# Patient Record
(demographics unavailable — no encounter records)

---

## 2024-10-28 NOTE — DISCUSSION/SUMMARY
[Normal Growth] : growth [Normal Development] : developmental [No Elimination Concerns] : elimination [Continue Regimen] : feeding [No Skin Concerns] : skin [Normal Sleep Pattern] : sleep [None] : no known medical problems [Anticipatory Guidance Given] : Anticipatory guidance addressed as per the history of present illness section [No Vaccines] : no vaccines needed [No Medications] : ~He/She~ is not on any medications [Parent/Guardian] : Parent/Guardian [FreeTextEntry1] : Feedings on demand, with a back up plan for scheduled feedings every 2-3 hours. Once weight gain is well established, it is generally then time to forgo the back up plan scheduled feedings. Clustered feedings, amount per feedings, and spacing of feedings will change frequently; follow the infant's lead.Anticipate 8-12 feedings per day.  Breast feeding benefits reviewed, as well as basic best practices.  Prenatal risk factors for hip dysplasia reviewed. Hospital records reviewed if available. Advance as directed  Vitamin D relevance and importance reviewed Follow up with any directions from the hospital or medical team including any prenatal discussions on matters that may require follow up. Some examples may include sonogram findings related to the kidneys, brain, or heart. [email or web address] as a resource over generic searches Cord and skin care Temperature definitions in infants, measuring temperature, presence or absence of concerning symptoms for temperature context, and appropriate timing of access to care reviewed.    Jaundice  Increase volume of feeds  FU 1week sooner prn as diorected

## 2024-10-28 NOTE — PHYSICAL EXAM

## 2024-11-05 NOTE — DISCUSSION/SUMMARY
[FreeTextEntry1] : Feedings on demand.  Bowel movements voiding and sleeping patterns going well. Appropriate weight gain reviewed.  Anticipatory guidance for common signs such as sorting gently, typical rashes, hiccough, cough, and sneezing.  Cord and skin care reviewed.  Utilize [email or web address] to access up-to-date pediatric relevant answers to questions, and call anytime for questions or concerns.  Vitamin D for breast-fed infants reviewed.  Follow up for checkup,or sooner as needed.   MOm A+ G6PD neg  Bili 8 in hospital tday minimal jaundice  Doing well on ALimentum  Initial symptoms on milk-based formula included feeding aversion symptoms.  This was characterized by back arching, inefficient feeds, long feeds, starting and stopping feedings.  Overall, there was a decrease in intake.  After meals, dyschezia, mucousy bloody stools, and signs of hunger that could not be satiated with offering food.  Serial attempts escalating to higher levels of predigested formulas included partially digested, soy-based, goat milk-based, and finally near a high level hypoallergenic formula.  Attempts to alleviate symptoms with acid reflux medicine were not successful.  It was only after the introduction of high level hypoallergenic formulas that the constellation of symptoms was alleviated.  Therefore it is appropriate to continue the hypoallergenic formula as prescribed.  Rechallenge to be considered at 1 year of age.

## 2024-11-05 NOTE — HISTORY OF PRESENT ILLNESS
[de-identified] : weight check  [FreeTextEntry6] : Feedings on demand.  Bowel movements voiding and sleeping patterns going well. Appropriate weight gain reviewed.  Anticipatory guidance for common signs such as sorting gently, typical rashes, hiccough, cough, and sneezing.  Cord and skin care reviewed.  Utilize [email or web address] to access up-to-date pediatric relevant answers to questions, and call anytime for questions or concerns.  Vitamin D for breast-fed infants reviewed.  Follow up for checkup,or sooner as needed.

## 2024-11-25 NOTE — HISTORY OF PRESENT ILLNESS
[Parents] : parents [Formula ___ oz/feed] : [unfilled] oz of formula per feed [Hours between feeds ___] : Child is fed every [unfilled] hours [Normal] : Normal [Frequency of stools: ___] : Frequency of stools: [unfilled]  stools [per day] : per day. [Yellow] : yellow [In Bassinet/Crib] : sleeps in bassinet/crib [On back] : sleeps on back [Pacifier use] : Pacifier use [Water heater temperature set at <120 degrees F] : Water heater temperature set at <120 degrees F [Rear facing car seat in back seat] : Rear facing car seat in back seat [Carbon Monoxide Detectors] : Carbon monoxide detectors at home [Smoke Detectors] : Smoke detectors at home. [de-identified] : Concern about crossed eyes [de-identified] : Alimentum

## 2024-11-25 NOTE — PHYSICAL EXAM
[Alert] : alert [Normocephalic] : normocephalic [Flat Open Anterior [place]] : flat open anterior fontanelle [PERRL] : PERRL [Red Reflex Bilateral] : red reflex bilateral [Normally Placed Ears] : normally placed ears [Auricles Well Formed] : auricles well formed [Clear Tympanic membranes] : clear tympanic membranes [Light reflex present] : light reflex present [Bony landmarks visible] : bony landmarks visible [Nares Patent] : nares patent [Palate Intact] : palate intact [Uvula Midline] : uvula midline [Supple, full passive range of motion] : supple, full passive range of motion [Symmetric Chest Rise] : symmetric chest rise [Clear to Auscultation Bilaterally] : clear to auscultation bilaterally [Regular Rate and Rhythm] : regular rate and rhythm [S1, S2 present] : S1, S2 present [+2 Femoral Pulses] : +2 femoral pulses [Soft] : soft [Bowel Sounds] : bowel sounds present [Normal external genitailia] : normal external genitalia [Patent Vagina] : vagina patent [Normally Placed] : normally placed [No Abnormal Lymph Nodes Palpated] : no abnormal lymph nodes palpated [Symmetric Flexed Extremities] : symmetric flexed extremities [Startle Reflex] : startle reflex present [Suck Reflex] : suck reflex present [Rooting] : rooting reflex present [Palmar Grasp] : palmar grasp reflex present [Plantar Grasp] : plantar grasp reflex present [Symmetric Jennifer] : symmetric [place] [Acute Distress] : no acute distress [Discharge] : no discharge [Palpable Masses] : no palpable masses [Murmurs] : no murmurs [Tender] : nontender [Distended] : not distended [Hepatomegaly] : no hepatomegaly [Splenomegaly] : no splenomegaly [Clitoromegaly] : no clitoromegaly [Bradley-Ortolani] : negative Bradley-Ortolani [Spinal Dimple] : no spinal dimple [Tuft of Hair] : no tuft of hair [Jaundice] : no jaundice [Rash and/or lesion present] : no rash/lesion

## 2024-11-25 NOTE — DISCUSSION/SUMMARY
[FreeTextEntry1] : Baby gaining weight beautifully. Normal NBS and G6PD records reviewed. Continue ad odalis feeding. When in car, patient should be in rear-facing car seat in back seat. Put baby to sleep on back, in own crib with no loose or soft bedding. Help baby to develop sleep and feeding routines. May offer pacifier if needed. Start tummy time when awake. Limit baby's exposure to others, especially those with fever or unknown vaccine status. Parents counseled to call if rectal temperature >100.4 degrees F.  UTD with vaccines. Return in 1 month for 2m WCC.

## 2024-12-04 NOTE — PHYSICAL EXAM
[Clear] : right tympanic membrane clear [Erythema] : no erythema [Bulging] : not bulging [Purulent Effusion] : no purulent effusion [Congestion] : congestion [Clear to Auscultation Bilaterally] : clear to auscultation bilaterally [Wheezing] : no wheezing [Rales] : no rales [Crackles] : no crackles [Tachypnea] : no tachypnea [Rhonchi] : no rhonchi [Subcostal Retractions] : no subcostal retractions [Suprasternal Retractions] : no suprasternal retractions [NL] : no abnormal lymph nodes palpated [No Abnormal Lymph Nodes Palpated] : no abnormal lymph nodes palpated [FreeTextEntry4] : mild congestion

## 2024-12-04 NOTE — HISTORY OF PRESENT ILLNESS
[de-identified] : congestion [FreeTextEntry6] : Baby with congestion x 3 days. Older sister with cough and congestion at home, no fevers. Baby sounding more stridorous at night when sleeping, usually has noisy breathing when laying down. No respiratory distress. Eating well.

## 2024-12-04 NOTE — DISCUSSION/SUMMARY
[FreeTextEntry1] : Baby with likely viral illness. Discussed nasal clearance with saline drops and suction, can use humidifier as needed. Discussed monitoring feeds, wet diapers, fevers. Return precautions discussed.

## 2025-01-17 NOTE — PHYSICAL EXAM
[Alert] : alert [Normocephalic] : normocephalic [Flat Open Anterior [place]] : flat open anterior fontanelle [PERRL] : PERRL [Red Reflex Bilateral] : red reflex bilateral [Normally Placed Ears] : normally placed ears [Auricles Well Formed] : auricles well formed [Clear Tympanic membranes] : clear tympanic membranes [Light reflex present] : light reflex present [Bony landmarks visible] : bony landmarks visible [Nares Patent] : nares patent [Palate Intact] : palate intact [Uvula Midline] : uvula midline [Supple, full passive range of motion] : supple, full passive range of motion [Symmetric Chest Rise] : symmetric chest rise [Clear to Auscultation Bilaterally] : clear to auscultation bilaterally [Regular Rate and Rhythm] : regular rate and rhythm [S1, S2 present] : S1, S2 present [+2 Femoral Pulses] : +2 femoral pulses [Soft] : soft [Bowel Sounds] : bowel sounds present [Normal external genitailia] : normal external genitalia [Patent Vagina] : vagina patent [Normally Placed] : normally placed [No Abnormal Lymph Nodes Palpated] : no abnormal lymph nodes palpated [Symmetric Flexed Extremities] : symmetric flexed extremities [Startle Reflex] : startle reflex present [Suck Reflex] : suck reflex present [Rooting] : rooting reflex present [Palmar Grasp] : palmar grasp reflex present [Plantar Grasp] : plantar grasp reflex present [Symmetric Jennifer] : symmetric [place] [Acute Distress] : no acute distress [Discharge] : no discharge [Palpable Masses] : no palpable masses [Murmurs] : no murmurs [Tender] : nontender [Distended] : not distended [Hepatomegaly] : no hepatomegaly [Splenomegaly] : no splenomegaly [Clitoromegaly] : no clitoromegaly [Bradley-Ortolani] : negative Bradley-Ortolani [Spinal Dimple] : no spinal dimple [Tuft of Hair] : no tuft of hair [Rash and/or lesion present] : no rash/lesion [FreeTextEntry5] : tracking [de-identified] : small white comedone on right nipple, likely cyst

## 2025-01-17 NOTE — DISCUSSION/SUMMARY
[] : The components of the vaccine(s) to be administered today are listed in the plan of care. The disease(s) for which the vaccine(s) are intended to prevent and the risks have been discussed with the caretaker.  The risks are also included in the appropriate vaccination information statements which have been provided to the patient's caregiver.  The caregiver has given consent to vaccinate. [FreeTextEntry1] : 2m baby here for WCC, gaining weight and developing beautifully. Discussed sleeping, feeding, stooling, and tummy time. Will receive Vaxelis, and PCV20 today. Return in 1 month for [place]. Return in 2 months for 4m WCC.  When in car, patient should be in rear-facing car seat in back seat. Put baby to sleep on back, in own crib with no loose or soft bedding. Help baby to maintain sleep and feeding routines. May offer pacifier if needed. Continue tummy time when awake. Parents counseled to call if rectal temperature >100.4 degrees F. Return precautions given.

## 2025-01-17 NOTE — HISTORY OF PRESENT ILLNESS
[Mother] : mother [Formula ___ oz/feed] : [unfilled] oz of formula per feed [Hours between feeds ___] : Child is fed every [unfilled] hours [Normal] : Normal [In Bassinet/Crib] : sleeps in bassinet/crib [On back] : sleeps on back [Water heater temperature set at <120 degrees F] : Water heater temperature set at <120 degrees F [Rear facing car seat in back seat] : Rear facing car seat in back seat [Carbon Monoxide Detectors] : Carbon monoxide detectors at home [Smoke Detectors] : Smoke detectors at home. [FreeTextEntry7] : Seen  by Ophtho for right eye drifting, will revisit in 2 months.

## 2025-01-22 NOTE — DISCUSSION/SUMMARY
[] : The components of the vaccine(s) to be administered today are listed in the plan of care. The disease(s) for which the vaccine(s) are intended to prevent and the risks have been discussed with the caretaker.  The risks are also included in the appropriate vaccination information statements which have been provided to the patient's caregiver.  The caregiver has given consent to vaccinate. [FreeTextEntry1] : Presents to review immunization needs.     Nurse reviewed vaccines with provider/ACIP recommendation.     No current symptoms or PMH contraindicating vaccination.  Expectant care. Follow up as needed for fever trend, new, or concerning symptoms.

## 2025-01-31 NOTE — DISCUSSION/SUMMARY
[FreeTextEntry1] : Baby with acute URI found to have right serous AOM. Will start on amoxicillin BID x 10 days. Complete antibiotic course. Potential side effect of antibiotics includes but not limited to diarrhea. Provide ibuprofen as needed for pain or fever. If no improvement within 48 hours return for re-evaluation.   Untreated ear infections may lead to: Permanent hearing loss Problems with speech and language development Spread of infection to neighboring tissues and organs, such as mastoiditis or meningitis  Treat as directed and follow up as needed for fever trend, new, or worsening symptoms. Call or return if rash or significant vomiting develops after starting medication. Some side effects of medication that may not need special treatments include loose BMs.  Return in 1 week for re-evaluation.

## 2025-01-31 NOTE — HISTORY OF PRESENT ILLNESS
[FreeTextEntry6] : Congestion and cough x 4-5 days, cough sounds like it has settled in the chest. Fever 4 days ago. Has been eating well. [de-identified] : cough, congestion

## 2025-01-31 NOTE — HISTORY OF PRESENT ILLNESS
[FreeTextEntry6] : Congestion and cough x 4-5 days, cough sounds like it has settled in the chest. Fever 4 days ago. Has been eating well. [de-identified] : cough, congestion

## 2025-01-31 NOTE — PHYSICAL EXAM
[Cerumen in canal] : cerumen in canal [Bilateral] : (bilateral) [Erythema] : erythema [Bulging] : bulging [Purulent Effusion] : purulent effusion [Rales] : no rales [Transmitted Upper Airway Sounds] : transmitted upper airway sounds [Tachypnea] : no tachypnea [Subcostal Retractions] : no subcostal retractions [Belly Breathing] : no belly breathing [Suprasternal Retractions] : no suprasternal retractions [NL] : regular rate and rhythm, normal S1, S2 audible, no murmurs [FreeTextEntry7] : loud transmitted upper airway sounds

## 2025-01-31 NOTE — PHYSICAL EXAM
[Cerumen in canal] : cerumen in canal [Bilateral] : (bilateral) [Bulging] : bulging [Erythema] : erythema [Purulent Effusion] : purulent effusion [Rales] : no rales [Transmitted Upper Airway Sounds] : transmitted upper airway sounds [Tachypnea] : no tachypnea [Belly Breathing] : no belly breathing [Subcostal Retractions] : no subcostal retractions [Suprasternal Retractions] : no suprasternal retractions [NL] : regular rate and rhythm, normal S1, S2 audible, no murmurs [FreeTextEntry7] : loud transmitted upper airway sounds

## 2025-02-04 NOTE — PLAN
[TextEntry] : - Treat with medication per order - R AOM still present despite 6 doses of amoxicillin, D/C amox. and start Augmentin - Symptomatic treatment with acetaminophen prn - Saline nose drops, cool mist humidifier and nasal suction prn - Follow up in 10-14 days for ear recheck

## 2025-02-04 NOTE — PHYSICAL EXAM
[TextEntry] : General: alert and active in no apparent distress Head: AFOF Eyes: conjunctiva clear Ears: right TM opaque and erythematous, left TM translucent, normal canals Nose: + clear rhinorrhea, +congestion OP: no tonsillar enlargement/exudate, no lesions, no posterior pharyngeal erythema Neck: supple, no adenopathy Lungs: clear to auscultation bilaterally, good air exchange, no retractions, comfortable WOB, + transmitted upper airway sounds CVS: Normal rate, regular rhythm, no murmur Abdomen: soft, nondistended, nontender, and no hepatosplenomegaly or masses, normoactive bowel sounds Skin: No rashes, lesions or skin changes

## 2025-02-04 NOTE — HISTORY OF PRESENT ILLNESS
[de-identified] : 3month old f c/o cough congested on amox for ear infection. no rectal temp as per parents [FreeTextEntry6] : Has had cough and congestion x 10 days, fever on the first day only Was diagnosed with R AOM on 1/31, currently has had 6 doses of amoxicillin, tolerating well Patient returns for nasal congestion and mom hearing a "rattle" noise in left lung no fevers, V/D

## 2025-02-14 NOTE — HISTORY OF PRESENT ILLNESS
[de-identified] : recheck ears doing better no fever no cough no congestion  [FreeTextEntry6] : Dx R OM 1/31, on amox x3 days but no improvement so switched to augmentin, completed course and feelign well - No ear pain/tugging - No fever - No cough - No congestion - Normal appetite

## 2025-02-24 NOTE — DISCUSSION/SUMMARY
[] : The components of the vaccine(s) to be administered today are listed in the plan of care. The disease(s) for which the vaccine(s) are intended to prevent and the risks have been discussed with the caretaker.  The risks are also included in the appropriate vaccination information statements which have been provided to the patient's caregiver.  The caregiver has given consent to vaccinate.
medication therapy

## 2025-02-24 NOTE — HISTORY OF PRESENT ILLNESS
[Parents] : parents [In Bassinet/Crib] : sleeps in bassinet/crib [On back] : sleeps on back [No] : No cigarette smoke exposure [Water heater temperature set at <120 degrees F] : Water heater temperature set at <120 degrees F [Rear facing car seat in back seat] : Rear facing car seat in back seat [Carbon Monoxide Detectors] : Carbon monoxide detectors at home [Smoke Detectors] : Smoke detectors at home. [NO] : No [Co-sleeping] : no co-sleeping [Exposure to electronic nicotine delivery system] : No exposure to electronic nicotine delivery system [FreeTextEntry7] : 4 mo c [FreeTextEntry1] :  Baby with no fevers, low temperatures, cough, congestion, rhinorrhea, vomiting, diarrhea or concerning symptoms per parents.  Feeding well alimentum 4oz every 3 hours during the day and goes 6-7 hours stretch at night tolerating well had blood and diarrhea-mpa diagnosed previously   Adequate bowel movements, yellowish greenish at least once daily  Adequate urination with every feeding about  Sleeping well/good sleeping patterns.  Parent(s) have no current concerns or issues.

## 2025-02-24 NOTE — DEVELOPMENTAL MILESTONES
[Normal Development] : Normal Development [None] : none [Passed] : passed [FreeTextEntry1] : GM: 4-1 FMA: 4-2 PS: 4 L: 5-2

## 2025-02-24 NOTE — PLAN
[TextEntry] : Recommend breastfeeding, 8-12 feedings per day. Mother should continue prenatal vitamins and avoid alcohol. If formula is needed, recommend iron-fortified formulations, 2-4 oz every 3-4 hrs. Cereal may be introduced using a spoon and bowl. When in car, patient should be in rear-facing car seat in back seat. Put baby to sleep on back, in own crib with no loose or soft bedding.  Help baby to maintain sleep and feeding routines. May offer pacifier if needed. Continue tummy time when awake. Follow up at 6mo or sooner if concerns.

## 2025-04-26 NOTE — HISTORY OF PRESENT ILLNESS
[de-identified] : As per parents, pt sounds congested, had a temp of 100.5, last dose of Tylenol 1hr ago. Has decreased appetite. About 6 wet diapers a day. Parents refused rectal temp at time of visit [FreeTextEntry6] : Runny nose for a few days, fever since last night. Took a little less bottle than usual. Normal wet diapers. Had tylenol about an hour ago and was sweating in the car.

## 2025-05-16 NOTE — HISTORY OF PRESENT ILLNESS
[Parents] : parents [In Bassinet/Crib] : sleeps in bassinet/crib [On back] : sleeps on back [No] : No cigarette smoke exposure [Water heater temperature set at <120 degrees F] : Water heater temperature set at <120 degrees F [Rear facing car seat in back seat] : Rear facing car seat in back seat [Carbon Monoxide Detectors] : Carbon monoxide detectors at home [Smoke Detectors] : Smoke detectors at home. [NO] : No [Co-sleeping] : no co-sleeping [Exposure to electronic nicotine delivery system] : No exposure to electronic nicotine delivery system [de-identified] : 6m wcc [FreeTextEntry1] :  Baby with no fevers, low temperatures, cough, congestion, rhinorrhea, vomiting, diarrhea or concerning symptoms per parents.  Feeding well formula alimentum around 30oz, two meals a day tolerating everything well so far   Adequate bowel movements, yellowish greenish at least once daily  Adequate urination with every feeding about  Sleeping well/good sleeping patterns. has follow up in sept with ophthalmology for concern about her right eye being "off sometimes " sister has been sick all week with high fever cough and sore throat, its "going through the house" and patient appears to have cough that started today, feels warm now

## 2025-05-16 NOTE — PLAN
[TextEntry] : Recommend breastfeeding or formula 2-4 oz every 3-4 hrs. Introduce single-ingredient foods one at a time.  When teeth erupt brush daily as discussed. When in car, patient should be in rear-facing car seat in back seat. Put baby to sleep on back, in own crib with no loose or soft bedding. Help baby to maintain sleep and feeding routines. May offer pacifier if needed. Continue tummy time when awake. Ensure home is safe since baby is now more mobile. Read aloud to baby. Follow up at 9mo or sooner if concerns. Symptomatic treatment Maintain adequate hydration  Cool mist humidifier Saline nose drops and bulb suctioning as needed Stressed handwashing and infection control  Pay close observation for new or worsening symptoms Instructed to return to office if condition worsens or new symptoms arise Go to ER or UC if condition worsens or unable to to get to the office or after office hours will hold on vaccines today since coming down with URI and suspected fever (99.4 in office already)

## 2025-05-27 NOTE — DISCUSSION/SUMMARY
[FreeTextEntry1] : 7mo F seen for follow up. URI resolved. OK for 6mo vaccinations. RTO at 9mo for WCC.